# Patient Record
Sex: FEMALE | Race: WHITE | URBAN - METROPOLITAN AREA
[De-identification: names, ages, dates, MRNs, and addresses within clinical notes are randomized per-mention and may not be internally consistent; named-entity substitution may affect disease eponyms.]

---

## 2017-10-13 ENCOUNTER — OFFICE VISIT (OUTPATIENT)
Dept: OTOLARYNGOLOGY | Facility: CLINIC | Age: 57
End: 2017-10-13

## 2017-10-13 VITALS
HEIGHT: 63 IN | SYSTOLIC BLOOD PRESSURE: 115 MMHG | BODY MASS INDEX: 27.29 KG/M2 | DIASTOLIC BLOOD PRESSURE: 76 MMHG | WEIGHT: 154 LBS | TEMPERATURE: 98 F

## 2017-10-13 DIAGNOSIS — R49.0 HOARSENESS: Primary | ICD-10-CM

## 2017-10-13 PROCEDURE — 99214 OFFICE O/P EST MOD 30 MIN: CPT | Performed by: OTOLARYNGOLOGY

## 2017-10-13 PROCEDURE — 31575 DIAGNOSTIC LARYNGOSCOPY: CPT | Performed by: OTOLARYNGOLOGY

## 2017-10-13 PROCEDURE — 99212 OFFICE O/P EST SF 10 MIN: CPT | Performed by: OTOLARYNGOLOGY

## 2017-10-13 RX ORDER — FLUTICASONE PROPIONATE 50 MCG
1 SPRAY, SUSPENSION (ML) NASAL 2 TIMES DAILY
Qty: 1 BOTTLE | Refills: 3 | Status: SHIPPED | OUTPATIENT
Start: 2017-10-13

## 2017-10-13 RX ORDER — MONTELUKAST SODIUM 10 MG/1
10 TABLET ORAL NIGHTLY
Qty: 30 TABLET | Refills: 3 | Status: SHIPPED | OUTPATIENT
Start: 2017-10-13

## 2017-10-13 NOTE — PROGRESS NOTES
Suresh Peters is a 60year old female. Patient presents with:  Voice Problem: hoarseness of voice for a month      HISTORY OF PRESENT ILLNESS  She presents with a history of throat discomfort in the posterior pharynx for about 2-3 months.  She quit toba VH,TVT, UTEROSACRAL SUSPENSION, POSTERIOR                REPAIR      REVIEW OF SYSTEMS    System Neg/Pos Details   Constitutional Negative Fatigue, fever and weight loss. ENMT Negative Drooling. Eyes Negative Blurred vision and vision changes.    Respir Normal Nares - Right: Normal Left: Normal. Septum -Normal  Turbinates - Right: Normal, Left: Normal.  Nasal mucosa–congested   Procedures:  Endoscopy/Laryngoscopy  Pre-Procedure Care: Verbal consent was obtained. Procedure/risks were explained.  Questions w Take 1 tablet (10 mg total) by mouth nightly., Disp: 30 tablet, Rfl: 3  •  loratadine (SB LORATADINE) 10 MG Oral Tab, Take 1 tablet (10 mg total) by mouth daily. , Disp: 30 tablet, Rfl: 3  ASSESSMENT AND PLAN    1.  Hoarseness  Laryngeal exam reveals erythem

## 2022-08-04 ENCOUNTER — OFFICE VISIT (OUTPATIENT)
Dept: OTOLARYNGOLOGY | Facility: CLINIC | Age: 62
End: 2022-08-04
Payer: COMMERCIAL

## 2022-08-04 VITALS — BODY MASS INDEX: 27 KG/M2 | HEIGHT: 63 IN

## 2022-08-04 DIAGNOSIS — R07.0 THROAT PAIN: Primary | ICD-10-CM

## 2022-08-04 PROCEDURE — 31575 DIAGNOSTIC LARYNGOSCOPY: CPT | Performed by: OTOLARYNGOLOGY

## 2022-08-04 PROCEDURE — 99203 OFFICE O/P NEW LOW 30 MIN: CPT | Performed by: OTOLARYNGOLOGY

## 2022-08-04 RX ORDER — MONTELUKAST SODIUM 10 MG/1
10 TABLET ORAL NIGHTLY
Qty: 30 TABLET | Refills: 3 | Status: SHIPPED | OUTPATIENT
Start: 2022-08-04

## 2022-08-04 RX ORDER — FLUTICASONE PROPIONATE 50 MCG
1 SPRAY, SUSPENSION (ML) NASAL 2 TIMES DAILY
Qty: 16 G | Refills: 3 | Status: SHIPPED | OUTPATIENT
Start: 2022-08-04

## 2025-07-01 ENCOUNTER — OFFICE VISIT (OUTPATIENT)
Dept: AUDIOLOGY | Facility: CLINIC | Age: 65
End: 2025-07-01

## 2025-07-01 ENCOUNTER — OFFICE VISIT (OUTPATIENT)
Dept: OTOLARYNGOLOGY | Facility: CLINIC | Age: 65
End: 2025-07-01

## 2025-07-01 DIAGNOSIS — R42 DIZZINESS: Primary | ICD-10-CM

## 2025-07-01 DIAGNOSIS — H90.3 SENSORINEURAL HEARING LOSS (SNHL) OF BOTH EARS: ICD-10-CM

## 2025-07-01 DIAGNOSIS — R26.89 IMBALANCE: ICD-10-CM

## 2025-07-01 PROCEDURE — 92567 TYMPANOMETRY: CPT | Performed by: AUDIOLOGIST

## 2025-07-01 PROCEDURE — 99213 OFFICE O/P EST LOW 20 MIN: CPT | Performed by: OTOLARYNGOLOGY

## 2025-07-01 PROCEDURE — 92557 COMPREHENSIVE HEARING TEST: CPT | Performed by: AUDIOLOGIST

## 2025-07-01 RX ORDER — MELOXICAM 15 MG/1
15 TABLET ORAL DAILY
Qty: 30 TABLET | Refills: 3 | Status: SHIPPED | OUTPATIENT
Start: 2025-07-01

## 2025-07-01 RX ORDER — CYCLOBENZAPRINE HCL 5 MG
5 TABLET ORAL NIGHTLY
Qty: 30 TABLET | Refills: 1 | Status: SHIPPED | OUTPATIENT
Start: 2025-07-01

## 2025-07-01 RX ORDER — CETIRIZINE HYDROCHLORIDE 10 MG/1
10 TABLET ORAL DAILY
COMMUNITY
Start: 2024-04-08

## 2025-07-01 NOTE — PROGRESS NOTES
Linda Aragon is a 64 year old female.    Chief Complaint   Patient presents with    Dizziness     Patient is here due to dizziness        HISTORY OF PRESENT ILLNESS  She presents with a history of throat discomfort in the posterior pharynx for about 2-3 months. She quit tobacco use 5 years ago having smoked 1 ppd for 30 years. On no meds for alergies or GERD. She denies GERD but admits to nasal congestion and PND. Seen by LMD and noted to have a \"white \" patch in the back of her throat. No other signs, symptoms or complaints.   8/22/16 Last visit started on Loratadine D, Singulair and Fluticasone for laryngeal erythema. Noted to have a left sided inclusion cyst. No previous lesions noted on mirror exam. Feels 95% better overall.      10/13/17 she presents with a one-month history of a sensation of discomfort in her throat as well as voice changes.  She had an upper respiratory tract infection about a month ago which resolved but she continues to have some scratchiness to her throat as well as discomfort.   8/4/22 I last saw her about 5 years ago for similar issues and was treated with allergy medication with resolution of her symptoms.  Very concerned about potential laryngeal malignancy as her mom had a laryngeal cancer and more recently she has had a lobectomy of her right lung for cancer with no further treatment.  Not smoking any tobacco at this time.  No other signs, symptoms or complaints other than discomfort in her throat throat clearing cough and sensation of something being caught back there with scratchiness.     7/1/25 for 3-month history of episodes of imbalance.  Feels lightheaded feels like she might fall over notes darkness in her vision that she is not sure if this is due to the fact that she is closing her eyes.  Sometimes she will lay down and chest movement will make her have the sensations of imbalance for about 5 seconds at the longest.  No otologic signs or symptoms.  Audiogram was  performed today demonstrating normal downsloping to moderate high-frequency sensorineural hearing loss which is fairly symmetric.  She was seen by her primary care physician several months ago and started on steroids without any improvement in her symptoms.  She does admit to having bitemporal headaches as well but denies grinding or clenching behavior.    Social Hx on file[1]    Family History[2]    Past Medical History[3]    Past Surgical History[4]      REVIEW OF SYSTEMS    System Neg/Pos Details   Constitutional Negative Fatigue, fever and weight loss.   ENMT Negative Drooling.   Eyes Negative Blurred vision and vision changes.   Respiratory Negative Dyspnea and wheezing.   Cardio Negative Chest pain, irregular heartbeat/palpitations and syncope.   GI Negative Abdominal pain and diarrhea.   Endocrine Negative Cold intolerance and heat intolerance.   Neuro Negative Tremors.   Psych Negative Anxiety and depression.   Integumentary Negative Frequent skin infections, pigment change and rash.   Hema/Lymph Negative Easy bleeding and easy bruising.           PHYSICAL EXAM    There were no vitals taken for this visit.       Constitutional Normal Overall appearance - Normal.   Psychiatric Normal Orientation - Oriented to time, place, person & situation. Appropriate mood and affect.   Neck Exam Normal Inspection - Normal. Palpation - Normal. Parotid gland - Normal. Thyroid gland - Normal.   Eyes Normal Conjunctiva - Right: Normal, Left: Normal. Pupil - Right: Normal, Left: Normal. Fundus - Right: Normal, Left: Normal.   Neurological Normal Memory - Normal. Cranial nerves - Cranial nerves II through XII grossly intact.   Head/Face Normal Facial features - Normal. Eyebrows - Normal. Skull - Normal.        Nasopharynx Normal External nose - Normal. Lips/teeth/gums - Normal. Tonsils - Normal. Oropharynx - Normal.   Ears Normal Inspection - Right: Normal, Left: Normal. Canal - Right: Normal, Left: Normal. TM - Right: Normal,  Left: Normal.   Skin Normal Inspection - Normal.        Lymph Detail Normal Submental. Submandibular. Anterior cervical. Posterior cervical. Supraclavicular.        Nose/Mouth/Throat Normal External nose - Normal. Lips/teeth/gums - Normal. Tonsils - Normal. Oropharynx - Normal.   Nose/Mouth/Throat Normal Nares - Right: Normal Left: Normal. Septum -Normal  Turbinates - Right: Normal, Left: Normal.     Medications - Current[5]  ASSESSMENT AND PLAN    1. Dizziness  Interestingly she seems to have pain and discomfort in her bitemporal region these episodes of imbalance that only last for seconds.  TMJ issues?  I did ask her to trial meloxicam and cyclobenzaprine for a few weeks if she notes improvement then she will return to see me to discuss further management.  In addition should she have no improvement in her symptoms with meloxicam and cyclobenzaprine then I would highly recommend that she undergo a VNG.  She agrees with this plan  - Audiology Referral - [facility] ([facility])        This note was prepared using Dragon [employer] voice recognition dictation software. As a result errors may occur. When identified these errors have been corrected. While every attempt is made to correct errors during dictation discrepancies may still exist    Benjie Bruno MD    7/1/2025    11:22 AM         [1]   Social History  Socioeconomic History    Marital status: Single   Tobacco Use    Smoking status: Former     Current packs/day: 0.00     Types: Cigarettes     Quit date: 6/15/2010     Years since quitting: 15.0    Smokeless tobacco: Never   Vaping Use    Vaping status: Never Used   Substance and Sexual Activity    Alcohol use: No    Drug use: No   Other Topics Concern    Caffeine Concern Yes     Comment: Coffee 2 cups daily   [2]   Family History  Problem Relation Age of Onset    Cancer Father         brain    Cancer Mother         lung (smoker)    Cancer Brother    [3]   Past Medical History:   Cancer (HCC)    Lung  cancer    Emphysema lung (HCC)    Essential hypertension    H/O bone density study    Osteoporosis   [4]   Past Surgical History:  Procedure Laterality Date    Hysterectomy      Other surgical history  07/2014    VH,TVT, UTEROSACRAL SUSPENSION, POSTERIOR REPAIR   [5]   Current Outpatient Medications:     cetirizine 10 MG Oral Tab, Take 1 tablet (10 mg total) by mouth daily., Disp: , Rfl:     montelukast 10 MG Oral Tab, Take 1 tablet (10 mg total) by mouth nightly., Disp: 30 tablet, Rfl: 3    loratadine-pseudoephedrine ER 5-120 MG Oral Tablet 12 Hr, Take 1 tablet by mouth every 12 (twelve) hours., Disp: 60 tablet, Rfl: 3    fluticasone propionate 50 MCG/ACT Nasal Suspension, 1 spray by Nasal route 2 (two) times daily., Disp: 16 g, Rfl: 3    Montelukast Sodium 10 MG Oral Tab, Take 1 tablet (10 mg total) by mouth nightly., Disp: 30 tablet, Rfl: 3    Loratadine-Pseudoephedrine ER 5-120 MG Oral Tablet 12 Hr, Take 1 tablet by mouth every 12 (twelve) hours., Disp: 60 tablet, Rfl: 3    Fluticasone Propionate 50 MCG/ACT Nasal Suspension, 1 spray by Nasal route 2 (two) times daily., Disp: 1 Bottle, Rfl: 3    Rizatriptan Benzoate 10 MG Oral Tab, TK 1 T PO QD, Disp: , Rfl: 4    Alendronate Sodium 70 MG Oral Tab, TK 1 T PO 1 TIME A WEEK, Disp: , Rfl: 3    5 SERIES BP MONITOR Does not apply Device, , Disp: , Rfl:     Irbesartan 150 MG Oral Tab, TK 1 T PO ONCE A DAY, Disp: , Rfl: 0    DALIRESP 500 MCG Oral Tab, TK 1 T PO QD, Disp: , Rfl: 12    ROPINIRole HCl 0.25 MG Oral Tab, TK 1 T PO 1 TIME A DAY, Disp: , Rfl: 3    STIOLTO RESPIMAT 2.5-2.5 MCG/ACT Inhalation Aero Soln, INHALE TWO INHALATIONS D, Disp: , Rfl: 2    Fluticasone Propionate 50 MCG/ACT Nasal Suspension, 1 spray by Nasal route 2 (two) times daily., Disp: 1 Bottle, Rfl: 3    Montelukast Sodium (SINGULAIR) 10 MG Oral Tab, Take 1 tablet (10 mg total) by mouth nightly., Disp: 30 tablet, Rfl: 3    loratadine (SB LORATADINE) 10 MG Oral Tab, Take 1 tablet (10 mg total) by  mouth daily., Disp: 30 tablet, Rfl: 3